# Patient Record
Sex: MALE | Race: ASIAN | Employment: FULL TIME | URBAN - METROPOLITAN AREA
[De-identification: names, ages, dates, MRNs, and addresses within clinical notes are randomized per-mention and may not be internally consistent; named-entity substitution may affect disease eponyms.]

---

## 2018-06-08 ENCOUNTER — HOSPITAL ENCOUNTER (EMERGENCY)
Facility: HOSPITAL | Age: 53
Discharge: HOME | End: 2018-06-08
Attending: EMERGENCY MEDICINE
Payer: COMMERCIAL

## 2018-06-08 ENCOUNTER — APPOINTMENT (OUTPATIENT)
Dept: CT IMAGING | Facility: HOSPITAL | Age: 53
End: 2018-06-08
Attending: EMERGENCY MEDICINE
Payer: COMMERCIAL

## 2018-06-08 VITALS
RESPIRATION RATE: 17 BRPM | HEART RATE: 60 BPM | TEMPERATURE: 98 F | WEIGHT: 212 LBS | SYSTOLIC BLOOD PRESSURE: 118 MMHG | HEIGHT: 66 IN | BODY MASS INDEX: 34.07 KG/M2 | DIASTOLIC BLOOD PRESSURE: 74 MMHG | OXYGEN SATURATION: 97 %

## 2018-06-08 DIAGNOSIS — N20.1 URETERAL STONE: Primary | ICD-10-CM

## 2018-06-08 PROCEDURE — 96361 HYDRATE IV INFUSION ADD-ON: CPT

## 2018-06-08 PROCEDURE — 96374 THER/PROPH/DIAG INJ IV PUSH: CPT

## 2018-06-08 PROCEDURE — 80053 COMPREHEN METABOLIC PANEL: CPT

## 2018-06-08 PROCEDURE — 99284 EMERGENCY DEPT VISIT MOD MDM: CPT

## 2018-06-08 PROCEDURE — 80053 COMPREHEN METABOLIC PANEL: CPT | Performed by: EMERGENCY MEDICINE

## 2018-06-08 PROCEDURE — 85025 COMPLETE CBC W/AUTO DIFF WBC: CPT

## 2018-06-08 PROCEDURE — 81001 URINALYSIS AUTO W/SCOPE: CPT | Performed by: EMERGENCY MEDICINE

## 2018-06-08 PROCEDURE — 74176 CT ABD & PELVIS W/O CONTRAST: CPT | Performed by: EMERGENCY MEDICINE

## 2018-06-08 PROCEDURE — 85025 COMPLETE CBC W/AUTO DIFF WBC: CPT | Performed by: EMERGENCY MEDICINE

## 2018-06-08 PROCEDURE — 96375 TX/PRO/DX INJ NEW DRUG ADDON: CPT

## 2018-06-08 RX ORDER — ONDANSETRON 2 MG/ML
4 INJECTION INTRAMUSCULAR; INTRAVENOUS ONCE
Status: COMPLETED | OUTPATIENT
Start: 2018-06-08 | End: 2018-06-08

## 2018-06-08 RX ORDER — HYDROCODONE BITARTRATE AND ACETAMINOPHEN 5; 325 MG/1; MG/1
1-2 TABLET ORAL EVERY 4 HOURS PRN
Qty: 10 TABLET | Refills: 0 | Status: SHIPPED | OUTPATIENT
Start: 2018-06-08 | End: 2018-06-15

## 2018-06-08 RX ORDER — HYDROMORPHONE HYDROCHLORIDE 1 MG/ML
INJECTION, SOLUTION INTRAMUSCULAR; INTRAVENOUS; SUBCUTANEOUS
Status: DISCONTINUED
Start: 2018-06-08 | End: 2018-06-08

## 2018-06-08 RX ORDER — ONDANSETRON 4 MG/1
4 TABLET, ORALLY DISINTEGRATING ORAL EVERY 4 HOURS PRN
Qty: 10 TABLET | Refills: 0 | Status: SHIPPED | OUTPATIENT
Start: 2018-06-08 | End: 2018-06-15

## 2018-06-08 RX ORDER — KETOROLAC TROMETHAMINE 30 MG/ML
30 INJECTION, SOLUTION INTRAMUSCULAR; INTRAVENOUS ONCE
Status: COMPLETED | OUTPATIENT
Start: 2018-06-08 | End: 2018-06-08

## 2018-06-08 RX ORDER — HYDROMORPHONE HYDROCHLORIDE 1 MG/ML
0.5 INJECTION, SOLUTION INTRAMUSCULAR; INTRAVENOUS; SUBCUTANEOUS ONCE
Status: COMPLETED | OUTPATIENT
Start: 2018-06-08 | End: 2018-06-08

## 2018-06-08 RX ORDER — TAMSULOSIN HYDROCHLORIDE 0.4 MG/1
0.4 CAPSULE ORAL DAILY
Qty: 7 CAPSULE | Refills: 0 | Status: SHIPPED | OUTPATIENT
Start: 2018-06-08 | End: 2018-06-15

## 2018-06-08 RX ORDER — SODIUM CHLORIDE 9 MG/ML
INJECTION, SOLUTION INTRAVENOUS CONTINUOUS
Status: DISCONTINUED | OUTPATIENT
Start: 2018-06-08 | End: 2018-06-08

## 2018-06-08 RX ORDER — ONDANSETRON 2 MG/ML
INJECTION INTRAMUSCULAR; INTRAVENOUS
Status: DISCONTINUED
Start: 2018-06-08 | End: 2018-06-08

## 2018-06-08 NOTE — ED INITIAL ASSESSMENT (HPI)
Patient to ED for RLQ and left sided flank pain upon waking up this morning. Patient with history of renal colic. Denies N/V/D. Denies fevers. Denies urinary symptoms.

## 2018-06-08 NOTE — ED PROVIDER NOTES
Patient Seen in: [facility] Emergency Department    History   Patient presents with:  Abdomen/Flank Pain (GI/)    Stated Complaint: Flank pain     HPI    50-year-old male presents with abdominal and flank pain.   He currently reports 9 out of 10 pa rhythm and no murmur   Abdomen: Mild tenderness on palpation diffusely across the lower abdomen. No CVA tenderness on palpation or percussion bilaterally. Normal bowel sounds throughout. No abdominal masses.   No peritoneal signs   Extremities: no edema, IV,NO ORAL) (CPT=74176)  COMPARISON:  [facility] , CT ABD/PLV(S) Talya Gonzalez W/3D, 7/13/2011, 18:54. INDICATIONS:  Flank pain  TECHNIQUE:  Unenhanced multislice CT scanning from above the kidneys to below the urinary bladder.   2D rendering are generated on th pleural disease. OTHER:  Negative. CONCLUSION:   1. There is a 2 mm mildly obstructing calculus in the proximal left ureter. Multiple additional nonobstructing calculi in the kidneys are noted.   2.  The infrarenal abdominal aorta has mild in had no questions, complaints, or concerns. Patient felt comfortable going home.             Disposition and Plan     Clinical Impression:  Ureteral stone  (primary encounter diagnosis)    Disposition:  Discharge  6/8/2018 12:30 pm    Follow-up:  Lester Robles,

## 2018-06-09 ENCOUNTER — HOSPITAL ENCOUNTER (EMERGENCY)
Age: 53
Discharge: HOME | End: 2018-06-09
Attending: EMERGENCY MEDICINE
Payer: COMMERCIAL

## 2018-06-09 VITALS
DIASTOLIC BLOOD PRESSURE: 63 MMHG | SYSTOLIC BLOOD PRESSURE: 121 MMHG | RESPIRATION RATE: 20 BRPM | BODY MASS INDEX: 34.07 KG/M2 | HEART RATE: 84 BPM | HEIGHT: 66 IN | OXYGEN SATURATION: 96 % | WEIGHT: 212 LBS

## 2018-06-09 DIAGNOSIS — N23 RENAL COLIC: Primary | ICD-10-CM

## 2018-06-09 PROCEDURE — 99284 EMERGENCY DEPT VISIT MOD MDM: CPT

## 2018-06-09 PROCEDURE — 96374 THER/PROPH/DIAG INJ IV PUSH: CPT

## 2018-06-09 PROCEDURE — 96361 HYDRATE IV INFUSION ADD-ON: CPT

## 2018-06-09 PROCEDURE — 96375 TX/PRO/DX INJ NEW DRUG ADDON: CPT

## 2018-06-09 RX ORDER — KETOROLAC TROMETHAMINE 10 MG/1
10 TABLET, FILM COATED ORAL EVERY 6 HOURS PRN
Qty: 30 TABLET | Refills: 0 | Status: SHIPPED | OUTPATIENT
Start: 2018-06-09 | End: 2018-06-16

## 2018-06-09 RX ORDER — KETOROLAC TROMETHAMINE 30 MG/ML
30 INJECTION, SOLUTION INTRAMUSCULAR; INTRAVENOUS ONCE
Status: COMPLETED | OUTPATIENT
Start: 2018-06-09 | End: 2018-06-09

## 2018-06-09 RX ORDER — ONDANSETRON 2 MG/ML
4 INJECTION INTRAMUSCULAR; INTRAVENOUS ONCE
Status: COMPLETED | OUTPATIENT
Start: 2018-06-09 | End: 2018-06-09

## 2018-06-09 RX ORDER — ONDANSETRON 4 MG/1
4 TABLET, FILM COATED ORAL ONCE
Status: DISCONTINUED | OUTPATIENT
Start: 2018-06-09 | End: 2018-06-09

## 2018-06-09 RX ORDER — HYDROMORPHONE HYDROCHLORIDE 1 MG/ML
0.5 INJECTION, SOLUTION INTRAMUSCULAR; INTRAVENOUS; SUBCUTANEOUS EVERY 30 MIN PRN
Status: DISCONTINUED | OUTPATIENT
Start: 2018-06-09 | End: 2018-06-09

## 2018-06-09 NOTE — ED PROVIDER NOTES
Patient Seen in: [facility] In [place]    History   Patient presents with:  Abdomen/Flank Pain (GI/)    Stated Complaint: left sided kideny stone, dx today sent home with meds , [pain worse    HPI    This is a 81-year-old male with Lungs: Clear to auscultation bilaterally with no rales, no retractions, and no wheezing. HEART:  Regular rate and rhythm. S1 and S2. No murmurs, no rubs or gallops. ABDOMEN: Soft, nontender and nondistended. Normoactive bowel sounds. No rebound.  No gu

## 2018-07-10 PROBLEM — N28.1 RENAL CYST: Status: ACTIVE | Noted: 2018-07-10

## 2018-07-10 PROBLEM — N13.2 URETERAL STONE WITH HYDRONEPHROSIS: Status: ACTIVE | Noted: 2018-07-10

## 2022-07-09 ENCOUNTER — HOSPITAL ENCOUNTER (EMERGENCY)
Age: 57
Discharge: HOME | End: 2022-07-09
Attending: EMERGENCY MEDICINE
Payer: COMMERCIAL

## 2022-07-09 VITALS
HEART RATE: 92 BPM | WEIGHT: 200 LBS | HEIGHT: 66 IN | RESPIRATION RATE: 20 BRPM | OXYGEN SATURATION: 95 % | TEMPERATURE: 99 F | DIASTOLIC BLOOD PRESSURE: 81 MMHG | SYSTOLIC BLOOD PRESSURE: 127 MMHG | BODY MASS INDEX: 32.14 KG/M2

## 2022-07-09 DIAGNOSIS — R73.03 PREDIABETES: ICD-10-CM

## 2022-07-09 DIAGNOSIS — U07.1 COVID-19 VIRUS INFECTION: Primary | ICD-10-CM

## 2022-07-09 LAB
ALBUMIN SERPL-MCNC: 3.8 G/DL (ref 3.4–5)
ALBUMIN/GLOB SERPL: 1 {RATIO} (ref 1–2)
ALP LIVER SERPL-CCNC: 67 U/L
ALT SERPL-CCNC: 49 U/L
ANION GAP SERPL CALC-SCNC: 7 MMOL/L (ref 0–18)
AST SERPL-CCNC: 29 U/L (ref 15–37)
ATRIAL RATE: 93 BPM
BASOPHILS # BLD AUTO: 0.05 X10(3) UL (ref 0–0.2)
BASOPHILS NFR BLD AUTO: 0.5 %
BILIRUB SERPL-MCNC: 0.6 MG/DL (ref 0.1–2)
BUN BLD-MCNC: 16 MG/DL (ref 7–18)
CALCIUM BLD-MCNC: 9.1 MG/DL (ref 8.5–10.1)
CHLORIDE SERPL-SCNC: 103 MMOL/L (ref 98–112)
CO2 SERPL-SCNC: 25 MMOL/L (ref 21–32)
CREAT BLD-MCNC: 1.1 MG/DL
EOSINOPHIL # BLD AUTO: 0.25 X10(3) UL (ref 0–0.7)
EOSINOPHIL NFR BLD AUTO: 2.5 %
ERYTHROCYTE [DISTWIDTH] IN BLOOD BY AUTOMATED COUNT: 18.4 %
GLOBULIN PLAS-MCNC: 3.7 G/DL (ref 2.8–4.4)
GLUCOSE BLD-MCNC: 174 MG/DL (ref 70–99)
HCT VFR BLD AUTO: 43 %
HGB BLD-MCNC: 12.9 G/DL
IMM GRANULOCYTES # BLD AUTO: 0.04 X10(3) UL (ref 0–1)
IMM GRANULOCYTES NFR BLD: 0.4 %
LYMPHOCYTES # BLD AUTO: 0.75 X10(3) UL (ref 1–4)
LYMPHOCYTES NFR BLD AUTO: 7.6 %
MCH RBC QN AUTO: 18.8 PG (ref 26–34)
MCHC RBC AUTO-ENTMCNC: 30 G/DL (ref 31–37)
MCV RBC AUTO: 62.8 FL
MONOCYTES # BLD AUTO: 0.79 X10(3) UL (ref 0.1–1)
MONOCYTES NFR BLD AUTO: 8 %
NEUTROPHILS # BLD AUTO: 8.01 X10 (3) UL (ref 1.5–7.7)
NEUTROPHILS # BLD AUTO: 8.01 X10(3) UL (ref 1.5–7.7)
NEUTROPHILS NFR BLD AUTO: 81 %
OSMOLALITY SERPL CALC.SUM OF ELEC: 285 MOSM/KG (ref 275–295)
P AXIS: 62 DEGREES
P-R INTERVAL: 172 MS
PLATELET # BLD AUTO: 301 10(3)UL (ref 150–450)
PLATELET MORPHOLOGY: NORMAL
POTASSIUM SERPL-SCNC: 4.3 MMOL/L (ref 3.5–5.1)
PROT SERPL-MCNC: 7.5 G/DL (ref 6.4–8.2)
Q-T INTERVAL: 352 MS
QRS DURATION: 86 MS
QTC CALCULATION (BEZET): 437 MS
R AXIS: 65 DEGREES
RBC # BLD AUTO: 6.85 X10(6)UL
SARS-COV-2 RNA RESP QL NAA+PROBE: DETECTED
SODIUM SERPL-SCNC: 135 MMOL/L (ref 136–145)
T AXIS: 62 DEGREES
TROPONIN I HIGH SENSITIVITY: 5 NG/L
VENTRICULAR RATE: 93 BPM
WBC # BLD AUTO: 9.9 X10(3) UL (ref 4–11)

## 2022-07-09 PROCEDURE — 93010 ELECTROCARDIOGRAM REPORT: CPT

## 2022-07-09 PROCEDURE — 99284 EMERGENCY DEPT VISIT MOD MDM: CPT

## 2022-07-09 PROCEDURE — 85025 COMPLETE CBC W/AUTO DIFF WBC: CPT | Performed by: EMERGENCY MEDICINE

## 2022-07-09 PROCEDURE — 93005 ELECTROCARDIOGRAM TRACING: CPT

## 2022-07-09 PROCEDURE — 96375 TX/PRO/DX INJ NEW DRUG ADDON: CPT

## 2022-07-09 PROCEDURE — 85025 COMPLETE CBC W/AUTO DIFF WBC: CPT

## 2022-07-09 PROCEDURE — 80053 COMPREHEN METABOLIC PANEL: CPT | Performed by: EMERGENCY MEDICINE

## 2022-07-09 PROCEDURE — 84484 ASSAY OF TROPONIN QUANT: CPT | Performed by: EMERGENCY MEDICINE

## 2022-07-09 PROCEDURE — 96374 THER/PROPH/DIAG INJ IV PUSH: CPT

## 2022-07-09 PROCEDURE — 80053 COMPREHEN METABOLIC PANEL: CPT

## 2022-07-09 PROCEDURE — 84484 ASSAY OF TROPONIN QUANT: CPT

## 2022-07-09 RX ORDER — BEBTELOVIMAB 87.5 MG/ML
175 INJECTION, SOLUTION INTRAVENOUS ONCE
Status: COMPLETED | OUTPATIENT
Start: 2022-07-09 | End: 2022-07-09

## 2022-07-09 RX ORDER — BENZONATATE 100 MG/1
100 CAPSULE ORAL 3 TIMES DAILY PRN
Qty: 30 CAPSULE | Refills: 0 | Status: SHIPPED | OUTPATIENT
Start: 2022-07-09 | End: 2022-08-08

## 2022-07-09 RX ORDER — KETOROLAC TROMETHAMINE 15 MG/ML
15 INJECTION, SOLUTION INTRAMUSCULAR; INTRAVENOUS ONCE
Status: COMPLETED | OUTPATIENT
Start: 2022-07-09 | End: 2022-07-09

## 2022-07-09 RX ORDER — DIPHENHYDRAMINE HYDROCHLORIDE 50 MG/ML
25 INJECTION INTRAMUSCULAR; INTRAVENOUS ONCE
Status: COMPLETED | OUTPATIENT
Start: 2022-07-09 | End: 2022-07-09

## 2022-07-09 RX ORDER — ESCITALOPRAM OXALATE 10 MG/1
10 TABLET ORAL DAILY
COMMUNITY

## 2022-07-09 NOTE — ED INITIAL ASSESSMENT (HPI)
PT to the ED for evaluation of syncopal episode at home at 0400.  PT reports he has been coughing all night and has family members in the household who are currently COVID +

## 2023-09-06 ENCOUNTER — APPOINTMENT (OUTPATIENT)
Dept: CT IMAGING | Facility: HOSPITAL | Age: 58
End: 2023-09-06
Attending: EMERGENCY MEDICINE
Payer: COMMERCIAL

## 2023-09-06 ENCOUNTER — HOSPITAL ENCOUNTER (EMERGENCY)
Facility: HOSPITAL | Age: 58
Discharge: HOME | End: 2023-09-06
Attending: EMERGENCY MEDICINE
Payer: COMMERCIAL

## 2023-09-06 VITALS
HEART RATE: 69 BPM | SYSTOLIC BLOOD PRESSURE: 145 MMHG | OXYGEN SATURATION: 98 % | RESPIRATION RATE: 18 BRPM | HEIGHT: 67 IN | DIASTOLIC BLOOD PRESSURE: 89 MMHG | WEIGHT: 220 LBS | BODY MASS INDEX: 34.53 KG/M2

## 2023-09-06 DIAGNOSIS — R07.89 CHEST PAIN, MUSCULOSKELETAL: Primary | ICD-10-CM

## 2023-09-06 LAB
ALBUMIN SERPL-MCNC: 3.5 G/DL (ref 3.4–5)
ALBUMIN/GLOB SERPL: 0.9 {RATIO} (ref 1–2)
ALP LIVER SERPL-CCNC: 73 U/L
ALT SERPL-CCNC: 79 U/L
ANION GAP SERPL CALC-SCNC: 8 MMOL/L (ref 0–18)
APTT PPP: 40.7 SECONDS (ref 23.3–35.6)
AST SERPL-CCNC: 39 U/L (ref 15–37)
BASOPHILS # BLD AUTO: 0.06 X10(3) UL (ref 0–0.2)
BASOPHILS NFR BLD AUTO: 1 %
BILIRUB SERPL-MCNC: 0.4 MG/DL (ref 0.1–2)
BUN BLD-MCNC: 16 MG/DL (ref 7–18)
CALCIUM BLD-MCNC: 9 MG/DL (ref 8.5–10.1)
CHLORIDE SERPL-SCNC: 109 MMOL/L (ref 98–112)
CO2 SERPL-SCNC: 21 MMOL/L (ref 21–32)
CREAT BLD-MCNC: 0.88 MG/DL
D DIMER PPP FEU-MCNC: 0.5 UG/ML FEU (ref ?–0.57)
EGFRCR SERPLBLD CKD-EPI 2021: 100 ML/MIN/1.73M2 (ref 60–?)
EOSINOPHIL # BLD AUTO: 0.38 X10(3) UL (ref 0–0.7)
EOSINOPHIL NFR BLD AUTO: 6.2 %
ERYTHROCYTE [DISTWIDTH] IN BLOOD BY AUTOMATED COUNT: 18.7 %
FLUAV + FLUBV RNA SPEC NAA+PROBE: NEGATIVE
FLUAV + FLUBV RNA SPEC NAA+PROBE: NEGATIVE
GLOBULIN PLAS-MCNC: 3.8 G/DL (ref 2.8–4.4)
GLUCOSE BLD-MCNC: 132 MG/DL (ref 70–99)
HCT VFR BLD AUTO: 43.6 %
HGB BLD-MCNC: 13.3 G/DL
IMM GRANULOCYTES # BLD AUTO: 0.02 X10(3) UL (ref 0–1)
IMM GRANULOCYTES NFR BLD: 0.3 %
INR BLD: 0.95 (ref 0.85–1.16)
LYMPHOCYTES # BLD AUTO: 2.18 X10(3) UL (ref 1–4)
LYMPHOCYTES NFR BLD AUTO: 35.3 %
MCH RBC QN AUTO: 19.3 PG (ref 26–34)
MCHC RBC AUTO-ENTMCNC: 30.5 G/DL (ref 31–37)
MCV RBC AUTO: 63.2 FL
MONOCYTES # BLD AUTO: 0.46 X10(3) UL (ref 0.1–1)
MONOCYTES NFR BLD AUTO: 7.5 %
NEUTROPHILS # BLD AUTO: 3.07 X10 (3) UL (ref 1.5–7.7)
NEUTROPHILS # BLD AUTO: 3.07 X10(3) UL (ref 1.5–7.7)
NEUTROPHILS NFR BLD AUTO: 49.7 %
NT-PROBNP SERPL-MCNC: 16 PG/ML (ref ?–125)
OSMOLALITY SERPL CALC.SUM OF ELEC: 289 MOSM/KG (ref 275–295)
PLATELET # BLD AUTO: 284 10(3)UL (ref 150–450)
POTASSIUM SERPL-SCNC: 4 MMOL/L (ref 3.5–5.1)
PROT SERPL-MCNC: 7.3 G/DL (ref 6.4–8.2)
PROTHROMBIN TIME: 12.7 SECONDS (ref 11.6–14.8)
RBC # BLD AUTO: 6.9 X10(6)UL
RSV RNA SPEC NAA+PROBE: NEGATIVE
SARS-COV-2 RNA RESP QL NAA+PROBE: NOT DETECTED
SODIUM SERPL-SCNC: 138 MMOL/L (ref 136–145)
TROPONIN I HIGH SENSITIVITY: 6 NG/L
WBC # BLD AUTO: 6.2 X10(3) UL (ref 4–11)

## 2023-09-06 PROCEDURE — 93005 ELECTROCARDIOGRAM TRACING: CPT

## 2023-09-06 PROCEDURE — 85610 PROTHROMBIN TIME: CPT | Performed by: EMERGENCY MEDICINE

## 2023-09-06 PROCEDURE — 85379 FIBRIN DEGRADATION QUANT: CPT | Performed by: EMERGENCY MEDICINE

## 2023-09-06 PROCEDURE — 71275 CT ANGIOGRAPHY CHEST: CPT | Performed by: EMERGENCY MEDICINE

## 2023-09-06 PROCEDURE — 85730 THROMBOPLASTIN TIME PARTIAL: CPT | Performed by: EMERGENCY MEDICINE

## 2023-09-06 PROCEDURE — 96374 THER/PROPH/DIAG INJ IV PUSH: CPT

## 2023-09-06 PROCEDURE — 84484 ASSAY OF TROPONIN QUANT: CPT | Performed by: EMERGENCY MEDICINE

## 2023-09-06 PROCEDURE — 99285 EMERGENCY DEPT VISIT HI MDM: CPT

## 2023-09-06 PROCEDURE — 83880 ASSAY OF NATRIURETIC PEPTIDE: CPT | Performed by: EMERGENCY MEDICINE

## 2023-09-06 PROCEDURE — 85025 COMPLETE CBC W/AUTO DIFF WBC: CPT | Performed by: EMERGENCY MEDICINE

## 2023-09-06 PROCEDURE — 93010 ELECTROCARDIOGRAM REPORT: CPT

## 2023-09-06 PROCEDURE — 80053 COMPREHEN METABOLIC PANEL: CPT | Performed by: EMERGENCY MEDICINE

## 2023-09-06 PROCEDURE — 0241U SARS-COV-2/FLU A AND B/RSV BY PCR (GENEXPERT): CPT | Performed by: EMERGENCY MEDICINE

## 2023-09-06 RX ORDER — KETOROLAC TROMETHAMINE 15 MG/ML
INJECTION, SOLUTION INTRAMUSCULAR; INTRAVENOUS
Status: COMPLETED
Start: 2023-09-06 | End: 2023-09-06

## 2023-09-06 RX ORDER — KETOROLAC TROMETHAMINE 15 MG/ML
15 INJECTION, SOLUTION INTRAMUSCULAR; INTRAVENOUS ONCE
Status: COMPLETED | OUTPATIENT
Start: 2023-09-06 | End: 2023-09-06

## 2023-09-06 RX ORDER — NAPROXEN 500 MG/1
500 TABLET ORAL 2 TIMES DAILY PRN
Qty: 20 TABLET | Refills: 0 | Status: SHIPPED | OUTPATIENT
Start: 2023-09-06 | End: 2023-09-16

## 2023-09-06 NOTE — ED INITIAL ASSESSMENT (HPI)
Pt states left sided chest pain at 0430 today. Pt states pain is intermittent. Pt states pain with movement.

## 2023-09-07 LAB
ATRIAL RATE: 72 BPM
P AXIS: 2 DEGREES
P-R INTERVAL: 168 MS
Q-T INTERVAL: 362 MS
QRS DURATION: 78 MS
QTC CALCULATION (BEZET): 396 MS
R AXIS: 46 DEGREES
T AXIS: 44 DEGREES
VENTRICULAR RATE: 72 BPM

## 2023-10-17 ENCOUNTER — OFFICE VISIT (OUTPATIENT)
Facility: CLINIC | Age: 58
End: 2023-10-17

## 2023-10-17 ENCOUNTER — TELEPHONE (OUTPATIENT)
Facility: CLINIC | Age: 58
End: 2023-10-17

## 2023-10-17 VITALS
HEART RATE: 73 BPM | BODY MASS INDEX: 34.53 KG/M2 | DIASTOLIC BLOOD PRESSURE: 83 MMHG | SYSTOLIC BLOOD PRESSURE: 127 MMHG | WEIGHT: 220 LBS | HEIGHT: 67 IN

## 2023-10-17 DIAGNOSIS — R74.8 ELEVATED LIVER ENZYMES: Primary | ICD-10-CM

## 2023-10-17 DIAGNOSIS — Z12.11 COLON CANCER SCREENING: ICD-10-CM

## 2023-10-17 DIAGNOSIS — Z12.11 SCREEN FOR COLON CANCER: Primary | ICD-10-CM

## 2023-10-17 PROCEDURE — S0285 CNSLT BEFORE SCREEN COLONOSC: HCPCS | Performed by: STUDENT IN AN ORGANIZED HEALTH CARE EDUCATION/TRAINING PROGRAM

## 2023-10-17 PROCEDURE — 3008F BODY MASS INDEX DOCD: CPT | Performed by: STUDENT IN AN ORGANIZED HEALTH CARE EDUCATION/TRAINING PROGRAM

## 2023-10-17 PROCEDURE — 3074F SYST BP LT 130 MM HG: CPT | Performed by: STUDENT IN AN ORGANIZED HEALTH CARE EDUCATION/TRAINING PROGRAM

## 2023-10-17 PROCEDURE — 3079F DIAST BP 80-89 MM HG: CPT | Performed by: STUDENT IN AN ORGANIZED HEALTH CARE EDUCATION/TRAINING PROGRAM

## 2023-10-17 RX ORDER — SODIUM, POTASSIUM,MAG SULFATES 17.5-3.13G
SOLUTION, RECONSTITUTED, ORAL ORAL
Qty: 1 EACH | Refills: 0 | Status: SHIPPED | OUTPATIENT
Start: 2023-10-17

## 2023-10-17 NOTE — PATIENT INSTRUCTIONS
*Schedule ultrasound of your liver  *Get labs done when able to further evaluate your elevated liver numbers. _______________________________________________    1. Schedule colonoscopy with [facility] [Diagnosis: colon cancer screening]    2.  bowel prep from pharmacy (split dose suprep)    3. Continue all medications for procedure    4. Read all bowel prep instructions carefully    5. AVOID seeds, nuts, popcorn, raw fruits and vegetables (cooked is okay) for 2-3 days before procedure    6. If you start any NEW medication after your visit today, please notify us. Certain medications will need to be held before the procedure, or the procedure cannot be performed.

## 2023-10-17 NOTE — H&P
[facility], [facility] - Gastroenterology                                                                                                               Reason for consult: ***    Requesting physician or provider: Doris Mayers MD    No chief complaint on file. HPI:   Katherine Mendoza is a 63year old year-old man***woman with history of ***          Prior endoscopies:  ***    Soc:  -*** smoking  -*** Etoh    Wt Readings from Last 6 Encounters:  09/06/23 : 220 lb (99.8 kg)  07/09/22 : 200 lb (90.7 kg)  07/10/18 : 212 lb (96.2 kg)  06/09/18 : 212 lb (96.2 kg)  06/08/18 : 212 lb (96.2 kg)  12/26/14 : 200 lb (90.7 kg)       History, Medications, Allergies, ROS:      Past Medical History:   Diagnosis Date    Chest pain, unspecified 10/7/10    Essential hypertension     Hyperlipidemia     Pain in limb 10/7/10    Personal history of urinary calculi       No past surgical history on file. Family Hx:   Family History   Problem Relation Age of Onset    Diabetes Mother     Hypertension Mother     Hypertension Father       Social History:   Social History     Socioeconomic History    Marital status: Single   Tobacco Use    Smoking status: Every Day     Packs/day: .5     Types: Cigarettes    Smokeless tobacco: Never    Tobacco comments:     quit two years ago   Vaping Use    Vaping Use: Never used   Substance and Sexual Activity    Alcohol use: Yes     Comment: socially    Drug use: No   Other Topics Concern    Caffeine Concern Yes    Exercise No        Medications (Active prior to today's visit):  Current Outpatient Medications   Medication Sig Dispense Refill    escitalopram 10 MG Oral Tab Take 0.5 tablets (5 mg total) by mouth daily. CANDESARTAN CILEXETIL OR Take 8 mg by mouth daily. ATORVASTATIN CALCIUM OR Take by mouth.          Allergies:  No Known Allergies    ROS:   CONSTITUTIONAL:  negative for fevers, rigors  EYES: negative for diplopia   RESPIRATORY:  negative for severe shortness of breath  CARDIOVASCULAR:  negative for crushing sub-sternal chest pain  GASTROINTESTINAL:  see HPI  GENITOURINARY:  negative for dysuria or gross hematuria  INTEGUMENT/BREAST:  SKIN:  negative for jaundice   ALLERGIC/IMMUNOLOGIC:  negative for hay fever  ENDOCRINE:  negative for cold intolerance and heat intolerance  MUSCULOSKELETAL:  negative for joint effusion/severe erythema  BEHAVIOR/PSYCH:  negative for psychotic behavior      PHYSICAL EXAM:   There were no vitals taken for this visit. Gen- Patient appears comfortable and in no acute discomfort  HEENT: the sclera appears anicteric, oropharynx clear, mucus membranes appear moist  CV- regular rate and rhythm, the extremities are warm and well perfused   Lung- Moves air well; No labored breathing  Abdomen- soft, non-tender exam in all quadrants without rigidity or guarding, non-distended  Skin- No jaundice  Ext: no edema is evident. Neuro- Alert and interactive, and gross movements of extremities normal  Psych - appropriate, non-agitated    Labs/Imaging:     Patient's pertinent labs and imaging were reviewed and discussed with patient today. .  ASSESSMENT/PLAN:   ***    #***    Recommendations:  -***      Copy of this note sent to Dr. Jean Castro Orders This Visit:  No orders of the defined types were placed in this encounter. Meds This Visit:  Requested Prescriptions      No prescriptions requested or ordered in this encounter       Imaging & Referrals:  None         Rito Jones MD  [facility], [facility] Gastroenterology  10/17/2023      This note was partially prepared using [employer] voice recognition dictation software. As a result, errors may occur. When identified, these errors have been corrected.  While every attempt is made to correct errors during dictation, discrepancies may still exist.

## 2023-10-21 ENCOUNTER — LAB ENCOUNTER (OUTPATIENT)
Dept: LAB | Age: 58
End: 2023-10-21
Attending: STUDENT IN AN ORGANIZED HEALTH CARE EDUCATION/TRAINING PROGRAM

## 2023-10-21 DIAGNOSIS — R74.8 ELEVATED LIVER ENZYMES: ICD-10-CM

## 2023-10-21 LAB
DEPRECATED HBV CORE AB SER IA-ACNC: 696.8 NG/ML
HAV AB SER QL IA: REACTIVE
HBV CORE AB SERPL QL IA: NONREACTIVE
HBV SURFACE AB SER QL: NONREACTIVE
HBV SURFACE AB SERPL IA-ACNC: <3.1 MIU/ML
HBV SURFACE AG SERPL QL IA: NONREACTIVE
HCV AB SERPL QL IA: NONREACTIVE
IGA SERPL-MCNC: 395 MG/DL (ref 70–312)
IGM SERPL-MCNC: 85.8 MG/DL (ref 43–279)
IMMUNOGLOBULIN PNL SER-MCNC: 1250 MG/DL (ref 791–1643)
IRON SATN MFR SERPL: 30 %
IRON SERPL-MCNC: 108 UG/DL
TIBC SERPL-MCNC: 356 UG/DL (ref 240–450)
TRANSFERRIN SERPL-MCNC: 239 MG/DL (ref 200–360)

## 2023-10-21 PROCEDURE — 86803 HEPATITIS C AB TEST: CPT

## 2023-10-21 PROCEDURE — 86704 HEP B CORE ANTIBODY TOTAL: CPT

## 2023-10-21 PROCEDURE — 82784 ASSAY IGA/IGD/IGG/IGM EACH: CPT

## 2023-10-21 PROCEDURE — 80503 PATH CLIN CONSLTJ SF 5-20: CPT

## 2023-10-21 PROCEDURE — 86708 HEPATITIS A ANTIBODY: CPT

## 2023-10-21 PROCEDURE — 87340 HEPATITIS B SURFACE AG IA: CPT

## 2023-10-21 PROCEDURE — 36415 COLL VENOUS BLD VENIPUNCTURE: CPT

## 2023-10-21 PROCEDURE — 86709 HEPATITIS A IGM ANTIBODY: CPT

## 2023-10-21 PROCEDURE — 83540 ASSAY OF IRON: CPT

## 2023-10-21 PROCEDURE — 82728 ASSAY OF FERRITIN: CPT

## 2023-10-21 PROCEDURE — 86706 HEP B SURFACE ANTIBODY: CPT

## 2023-10-21 PROCEDURE — 83516 IMMUNOASSAY NONANTIBODY: CPT

## 2023-10-21 PROCEDURE — 83550 IRON BINDING TEST: CPT

## 2023-10-22 LAB — HAV IGM SER QL: NONREACTIVE

## 2023-10-23 NOTE — PROGRESS NOTES
I spoke to the patient/wife regarding results. Immune to hepatitis a. Awaiting smooth muscle antibody. He likely has HERZOG. Will follow up US liver (already scheduled). GI Staff:    Please place recall for CMP in 6 months.

## 2023-10-24 LAB — ACTIN SMOOTH MUSCLE AB: 15 UNITS

## 2023-10-29 ENCOUNTER — TELEPHONE (OUTPATIENT)
Facility: CLINIC | Age: 58
End: 2023-10-29

## 2023-10-29 NOTE — TELEPHONE ENCOUNTER
----- Message from Liz Hollis MD sent at 10/23/2023  2:34 PM CDT -----  I spoke to the patient/wife regarding results. Immune to hepatitis a. Awaiting smooth muscle antibody. He likely has HERZOG. Will follow up US liver (already scheduled). GI Staff:    Please place recall for CMP in 6 months.

## 2023-11-17 ENCOUNTER — HOSPITAL ENCOUNTER (OUTPATIENT)
Dept: CV DIAGNOSTICS | Age: 58
Discharge: HOME | End: 2023-11-17
Attending: INTERNAL MEDICINE
Payer: COMMERCIAL

## 2023-11-17 DIAGNOSIS — R06.09 DYSPNEA ON EXERTION: ICD-10-CM

## 2023-11-17 PROCEDURE — 93350 STRESS TTE ONLY: CPT | Performed by: INTERNAL MEDICINE

## 2023-11-17 PROCEDURE — 93018 CV STRESS TEST I&R ONLY: CPT | Performed by: INTERNAL MEDICINE

## 2023-11-17 PROCEDURE — 93017 CV STRESS TEST TRACING ONLY: CPT | Performed by: INTERNAL MEDICINE

## 2023-11-24 ENCOUNTER — HOSPITAL ENCOUNTER (OUTPATIENT)
Dept: ULTRASOUND IMAGING | Age: 58
Discharge: HOME | End: 2023-11-24
Attending: STUDENT IN AN ORGANIZED HEALTH CARE EDUCATION/TRAINING PROGRAM
Payer: COMMERCIAL

## 2023-11-24 DIAGNOSIS — R74.8 ELEVATED LIVER ENZYMES: ICD-10-CM

## 2023-11-24 PROCEDURE — 76705 ECHO EXAM OF ABDOMEN: CPT | Performed by: STUDENT IN AN ORGANIZED HEALTH CARE EDUCATION/TRAINING PROGRAM

## 2023-12-28 PROCEDURE — 88305 TISSUE EXAM BY PATHOLOGIST: CPT | Performed by: STUDENT IN AN ORGANIZED HEALTH CARE EDUCATION/TRAINING PROGRAM

## 2024-01-04 ENCOUNTER — TELEPHONE (OUTPATIENT)
Facility: CLINIC | Age: 59
End: 2024-01-04

## 2024-01-04 NOTE — TELEPHONE ENCOUNTER
Health Maintenance Updated.    3 year colonoscopy recall entered into patient outreach in [facility].  Next colonoscopy will be due 12/28/2026.    Patient viewed Dr. Siegel's result message in [employer]:  Seen by patient Ricardo Flores on 1/3/2024  6:21 PM

## 2024-01-04 NOTE — TELEPHONE ENCOUNTER
----- Message from Ken Siegel MD sent at 1/3/2024  2:51 PM CST -----  7 adenomas removed, should have repeat colonoscopy in 3 years.    I sent mychart to the patient.    GI Staff:    Can you please place recall for this patient to have a colonoscopy in 3 years.    Thank you.    Ken Siegel MD

## 2024-01-29 ENCOUNTER — MED REC SCAN ONLY (OUTPATIENT)
Facility: CLINIC | Age: 59
End: 2024-01-29

## 2024-03-19 ENCOUNTER — ORDER TRANSCRIPTION (OUTPATIENT)
Dept: ADMINISTRATIVE | Facility: HOSPITAL | Age: 59
End: 2024-03-19

## 2024-03-19 DIAGNOSIS — R06.09 DOE (DYSPNEA ON EXERTION): ICD-10-CM

## 2024-03-19 DIAGNOSIS — E78.2 MIXED HYPERLIPIDEMIA: ICD-10-CM

## 2024-03-19 DIAGNOSIS — E11.69 DIABETES MELLITUS ASSOCIATED WITH HORMONAL ETIOLOGY (HCC): Primary | ICD-10-CM

## 2024-03-19 DIAGNOSIS — E66.9 OBESITY, UNSPECIFIED: ICD-10-CM

## 2024-03-19 DIAGNOSIS — Z82.49 FAMILY HISTORY OF EARLY CAD: ICD-10-CM

## 2024-04-30 ENCOUNTER — HOSPITAL ENCOUNTER (OUTPATIENT)
Dept: CV DIAGNOSTICS | Facility: HOSPITAL | Age: 59
Discharge: HOME | End: 2024-04-30
Attending: NURSE PRACTITIONER
Payer: COMMERCIAL

## 2024-04-30 DIAGNOSIS — Z98.61 S/P PTCA (PERCUTANEOUS TRANSLUMINAL CORONARY ANGIOPLASTY): ICD-10-CM

## 2024-04-30 PROCEDURE — 93018 CV STRESS TEST I&R ONLY: CPT | Performed by: NURSE PRACTITIONER

## 2024-04-30 PROCEDURE — 93306 TTE W/DOPPLER COMPLETE: CPT | Performed by: NURSE PRACTITIONER

## 2024-04-30 PROCEDURE — 93017 CV STRESS TEST TRACING ONLY: CPT | Performed by: NURSE PRACTITIONER

## 2024-05-23 ENCOUNTER — APPOINTMENT (OUTPATIENT)
Dept: CARDIAC REHAB | Facility: HOSPITAL | Age: 59
End: 2024-05-23
Payer: COMMERCIAL

## 2024-06-04 ENCOUNTER — CARDPULM VISIT (OUTPATIENT)
Dept: CARDIAC REHAB | Facility: HOSPITAL | Age: 59
End: 2024-06-04
Payer: COMMERCIAL

## 2024-06-04 ENCOUNTER — ORDER TRANSCRIPTION (OUTPATIENT)
Dept: CARDIAC REHAB | Facility: HOSPITAL | Age: 59
End: 2024-06-04

## 2024-06-04 DIAGNOSIS — Z98.61 S/P PTCA (PERCUTANEOUS TRANSLUMINAL CORONARY ANGIOPLASTY): Primary | ICD-10-CM

## 2024-06-04 RX ORDER — ASPIRIN 81 MG/1
81 TABLET ORAL DAILY
COMMUNITY

## 2024-06-04 RX ORDER — CLOPIDOGREL BISULFATE 75 MG/1
75 TABLET ORAL DAILY
COMMUNITY

## 2024-06-04 RX ORDER — ROSUVASTATIN CALCIUM 40 MG/1
40 TABLET, COATED ORAL NIGHTLY
COMMUNITY

## 2024-06-10 ENCOUNTER — CARDPULM VISIT (OUTPATIENT)
Dept: CARDIAC REHAB | Facility: HOSPITAL | Age: 59
End: 2024-06-10
Payer: COMMERCIAL

## 2024-06-10 PROCEDURE — 93798 PHYS/QHP OP CAR RHAB W/ECG: CPT

## 2024-06-12 ENCOUNTER — CARDPULM VISIT (OUTPATIENT)
Dept: CARDIAC REHAB | Facility: HOSPITAL | Age: 59
End: 2024-06-12
Payer: COMMERCIAL

## 2024-06-12 PROCEDURE — 93798 PHYS/QHP OP CAR RHAB W/ECG: CPT

## 2024-06-13 ENCOUNTER — CARDPULM VISIT (OUTPATIENT)
Dept: CARDIAC REHAB | Facility: HOSPITAL | Age: 59
End: 2024-06-13
Payer: COMMERCIAL

## 2024-06-13 PROCEDURE — 93798 PHYS/QHP OP CAR RHAB W/ECG: CPT

## 2024-06-17 ENCOUNTER — CARDPULM VISIT (OUTPATIENT)
Dept: CARDIAC REHAB | Facility: HOSPITAL | Age: 59
End: 2024-06-17
Payer: COMMERCIAL

## 2024-06-17 PROCEDURE — 93798 PHYS/QHP OP CAR RHAB W/ECG: CPT

## 2024-06-19 ENCOUNTER — APPOINTMENT (OUTPATIENT)
Dept: CARDIAC REHAB | Facility: HOSPITAL | Age: 59
End: 2024-06-19

## 2024-06-19 PROCEDURE — 93798 PHYS/QHP OP CAR RHAB W/ECG: CPT

## 2024-06-20 ENCOUNTER — CARDPULM VISIT (OUTPATIENT)
Dept: CARDIAC REHAB | Facility: HOSPITAL | Age: 59
End: 2024-06-20

## 2024-06-20 PROCEDURE — 93798 PHYS/QHP OP CAR RHAB W/ECG: CPT

## 2024-06-24 ENCOUNTER — CARDPULM VISIT (OUTPATIENT)
Dept: CARDIAC REHAB | Facility: HOSPITAL | Age: 59
End: 2024-06-24

## 2024-06-24 PROCEDURE — 93798 PHYS/QHP OP CAR RHAB W/ECG: CPT

## 2024-06-26 ENCOUNTER — CARDPULM VISIT (OUTPATIENT)
Dept: CARDIAC REHAB | Facility: HOSPITAL | Age: 59
End: 2024-06-26

## 2024-06-26 PROCEDURE — 93798 PHYS/QHP OP CAR RHAB W/ECG: CPT

## 2024-06-27 ENCOUNTER — CARDPULM VISIT (OUTPATIENT)
Dept: CARDIAC REHAB | Facility: HOSPITAL | Age: 59
End: 2024-06-27

## 2024-06-27 PROCEDURE — 93798 PHYS/QHP OP CAR RHAB W/ECG: CPT

## 2024-07-01 ENCOUNTER — CARDPULM VISIT (OUTPATIENT)
Dept: CARDIAC REHAB | Facility: HOSPITAL | Age: 59
End: 2024-07-01
Payer: COMMERCIAL

## 2024-07-01 PROCEDURE — 93798 PHYS/QHP OP CAR RHAB W/ECG: CPT

## 2024-07-08 ENCOUNTER — CARDPULM VISIT (OUTPATIENT)
Dept: CARDIAC REHAB | Facility: HOSPITAL | Age: 59
End: 2024-07-08
Payer: COMMERCIAL

## 2024-07-08 PROCEDURE — 93798 PHYS/QHP OP CAR RHAB W/ECG: CPT

## 2024-07-10 ENCOUNTER — CARDPULM VISIT (OUTPATIENT)
Dept: CARDIAC REHAB | Facility: HOSPITAL | Age: 59
End: 2024-07-10
Payer: COMMERCIAL

## 2024-07-10 PROCEDURE — 93798 PHYS/QHP OP CAR RHAB W/ECG: CPT

## 2024-07-11 ENCOUNTER — CARDPULM VISIT (OUTPATIENT)
Dept: CARDIAC REHAB | Facility: HOSPITAL | Age: 59
End: 2024-07-11
Payer: COMMERCIAL

## 2024-07-11 PROCEDURE — 93798 PHYS/QHP OP CAR RHAB W/ECG: CPT

## 2024-07-15 ENCOUNTER — CARDPULM VISIT (OUTPATIENT)
Dept: CARDIAC REHAB | Facility: HOSPITAL | Age: 59
End: 2024-07-15
Payer: COMMERCIAL

## 2024-07-15 PROCEDURE — 93798 PHYS/QHP OP CAR RHAB W/ECG: CPT

## 2024-07-17 ENCOUNTER — CARDPULM VISIT (OUTPATIENT)
Dept: CARDIAC REHAB | Facility: HOSPITAL | Age: 59
End: 2024-07-17
Payer: COMMERCIAL

## 2024-07-17 PROCEDURE — 93798 PHYS/QHP OP CAR RHAB W/ECG: CPT

## 2024-07-18 ENCOUNTER — CARDPULM VISIT (OUTPATIENT)
Dept: CARDIAC REHAB | Facility: HOSPITAL | Age: 59
End: 2024-07-18
Payer: COMMERCIAL

## 2024-07-18 PROCEDURE — 93798 PHYS/QHP OP CAR RHAB W/ECG: CPT

## 2024-07-22 ENCOUNTER — CARDPULM VISIT (OUTPATIENT)
Dept: CARDIAC REHAB | Facility: HOSPITAL | Age: 59
End: 2024-07-22
Payer: COMMERCIAL

## 2024-07-22 PROCEDURE — 93798 PHYS/QHP OP CAR RHAB W/ECG: CPT

## 2024-07-24 ENCOUNTER — CARDPULM VISIT (OUTPATIENT)
Dept: CARDIAC REHAB | Facility: HOSPITAL | Age: 59
End: 2024-07-24
Payer: COMMERCIAL

## 2024-07-24 ENCOUNTER — APPOINTMENT (OUTPATIENT)
Dept: CARDIAC REHAB | Facility: HOSPITAL | Age: 59
End: 2024-07-24
Payer: COMMERCIAL

## 2024-07-24 PROCEDURE — 93798 PHYS/QHP OP CAR RHAB W/ECG: CPT

## 2024-07-25 ENCOUNTER — CARDPULM VISIT (OUTPATIENT)
Dept: CARDIAC REHAB | Facility: HOSPITAL | Age: 59
End: 2024-07-25
Payer: COMMERCIAL

## 2024-07-25 ENCOUNTER — APPOINTMENT (OUTPATIENT)
Dept: CARDIAC REHAB | Facility: HOSPITAL | Age: 59
End: 2024-07-25
Payer: COMMERCIAL

## 2024-07-25 PROCEDURE — 93798 PHYS/QHP OP CAR RHAB W/ECG: CPT

## 2024-07-29 ENCOUNTER — CARDPULM VISIT (OUTPATIENT)
Dept: CARDIAC REHAB | Facility: HOSPITAL | Age: 59
End: 2024-07-29
Payer: COMMERCIAL

## 2024-07-29 ENCOUNTER — APPOINTMENT (OUTPATIENT)
Dept: CARDIAC REHAB | Facility: HOSPITAL | Age: 59
End: 2024-07-29
Payer: COMMERCIAL

## 2024-07-29 PROCEDURE — 93798 PHYS/QHP OP CAR RHAB W/ECG: CPT

## 2024-07-31 ENCOUNTER — CARDPULM VISIT (OUTPATIENT)
Dept: CARDIAC REHAB | Facility: HOSPITAL | Age: 59
End: 2024-07-31
Payer: COMMERCIAL

## 2024-07-31 ENCOUNTER — APPOINTMENT (OUTPATIENT)
Dept: CARDIAC REHAB | Facility: HOSPITAL | Age: 59
End: 2024-07-31
Payer: COMMERCIAL

## 2024-07-31 PROCEDURE — 93798 PHYS/QHP OP CAR RHAB W/ECG: CPT

## 2024-08-01 ENCOUNTER — CARDPULM VISIT (OUTPATIENT)
Dept: CARDIAC REHAB | Facility: HOSPITAL | Age: 59
End: 2024-08-01
Payer: COMMERCIAL

## 2024-08-01 PROCEDURE — 93798 PHYS/QHP OP CAR RHAB W/ECG: CPT

## 2024-08-05 ENCOUNTER — APPOINTMENT (OUTPATIENT)
Dept: CARDIAC REHAB | Facility: HOSPITAL | Age: 59
End: 2024-08-05
Payer: COMMERCIAL

## 2024-08-07 ENCOUNTER — CARDPULM VISIT (OUTPATIENT)
Dept: CARDIAC REHAB | Facility: HOSPITAL | Age: 59
End: 2024-08-07
Payer: COMMERCIAL

## 2024-08-07 PROCEDURE — 93798 PHYS/QHP OP CAR RHAB W/ECG: CPT

## 2024-08-08 ENCOUNTER — CARDPULM VISIT (OUTPATIENT)
Dept: CARDIAC REHAB | Facility: HOSPITAL | Age: 59
End: 2024-08-08
Payer: COMMERCIAL

## 2024-08-08 PROCEDURE — 93798 PHYS/QHP OP CAR RHAB W/ECG: CPT

## 2024-08-12 ENCOUNTER — CARDPULM VISIT (OUTPATIENT)
Dept: CARDIAC REHAB | Facility: HOSPITAL | Age: 59
End: 2024-08-12
Payer: COMMERCIAL

## 2024-08-12 PROCEDURE — 93798 PHYS/QHP OP CAR RHAB W/ECG: CPT

## 2024-08-14 ENCOUNTER — CARDPULM VISIT (OUTPATIENT)
Dept: CARDIAC REHAB | Facility: HOSPITAL | Age: 59
End: 2024-08-14
Payer: COMMERCIAL

## 2024-08-14 PROCEDURE — 93798 PHYS/QHP OP CAR RHAB W/ECG: CPT

## 2024-08-15 ENCOUNTER — APPOINTMENT (OUTPATIENT)
Dept: CARDIAC REHAB | Facility: HOSPITAL | Age: 59
End: 2024-08-15
Payer: COMMERCIAL

## 2024-08-19 ENCOUNTER — APPOINTMENT (OUTPATIENT)
Dept: CARDIAC REHAB | Facility: HOSPITAL | Age: 59
End: 2024-08-19
Payer: COMMERCIAL

## 2024-08-22 ENCOUNTER — APPOINTMENT (OUTPATIENT)
Dept: CARDIAC REHAB | Facility: HOSPITAL | Age: 59
End: 2024-08-22
Payer: COMMERCIAL

## 2024-08-26 ENCOUNTER — APPOINTMENT (OUTPATIENT)
Dept: CARDIAC REHAB | Facility: HOSPITAL | Age: 59
End: 2024-08-26
Payer: COMMERCIAL

## 2024-08-28 ENCOUNTER — CARDPULM VISIT (OUTPATIENT)
Dept: CARDIAC REHAB | Facility: HOSPITAL | Age: 59
End: 2024-08-28
Payer: COMMERCIAL

## 2024-08-28 PROCEDURE — 93798 PHYS/QHP OP CAR RHAB W/ECG: CPT

## 2024-08-29 ENCOUNTER — CARDPULM VISIT (OUTPATIENT)
Dept: CARDIAC REHAB | Facility: HOSPITAL | Age: 59
End: 2024-08-29
Payer: COMMERCIAL

## 2024-08-29 PROCEDURE — 93798 PHYS/QHP OP CAR RHAB W/ECG: CPT

## 2024-09-04 ENCOUNTER — APPOINTMENT (OUTPATIENT)
Dept: CARDIAC REHAB | Facility: HOSPITAL | Age: 59
End: 2024-09-04
Payer: COMMERCIAL

## 2024-09-05 ENCOUNTER — APPOINTMENT (OUTPATIENT)
Dept: CARDIAC REHAB | Facility: HOSPITAL | Age: 59
End: 2024-09-05
Payer: COMMERCIAL

## 2024-09-09 ENCOUNTER — CARDPULM VISIT (OUTPATIENT)
Dept: CARDIAC REHAB | Facility: HOSPITAL | Age: 59
End: 2024-09-09
Payer: COMMERCIAL

## 2024-09-09 PROCEDURE — 93798 PHYS/QHP OP CAR RHAB W/ECG: CPT

## 2024-09-11 ENCOUNTER — CARDPULM VISIT (OUTPATIENT)
Dept: CARDIAC REHAB | Facility: HOSPITAL | Age: 59
End: 2024-09-11
Payer: COMMERCIAL

## 2024-09-11 PROCEDURE — 93798 PHYS/QHP OP CAR RHAB W/ECG: CPT

## 2024-09-12 ENCOUNTER — APPOINTMENT (OUTPATIENT)
Dept: CARDIAC REHAB | Facility: HOSPITAL | Age: 59
End: 2024-09-12
Payer: COMMERCIAL

## 2024-09-16 ENCOUNTER — APPOINTMENT (OUTPATIENT)
Dept: CARDIAC REHAB | Facility: HOSPITAL | Age: 59
End: 2024-09-16
Payer: COMMERCIAL

## 2024-09-18 ENCOUNTER — APPOINTMENT (OUTPATIENT)
Dept: CARDIAC REHAB | Facility: HOSPITAL | Age: 59
End: 2024-09-18
Payer: COMMERCIAL

## 2024-09-19 ENCOUNTER — APPOINTMENT (OUTPATIENT)
Dept: CARDIAC REHAB | Facility: HOSPITAL | Age: 59
End: 2024-09-19
Payer: COMMERCIAL

## 2024-09-23 ENCOUNTER — CARDPULM VISIT (OUTPATIENT)
Dept: CARDIAC REHAB | Facility: HOSPITAL | Age: 59
End: 2024-09-23
Payer: COMMERCIAL

## 2024-09-23 PROCEDURE — 93798 PHYS/QHP OP CAR RHAB W/ECG: CPT

## 2024-09-25 ENCOUNTER — CARDPULM VISIT (OUTPATIENT)
Dept: CARDIAC REHAB | Facility: HOSPITAL | Age: 59
End: 2024-09-25
Payer: COMMERCIAL

## 2024-09-25 PROCEDURE — 93798 PHYS/QHP OP CAR RHAB W/ECG: CPT

## 2024-09-26 ENCOUNTER — CARDPULM VISIT (OUTPATIENT)
Dept: CARDIAC REHAB | Facility: HOSPITAL | Age: 59
End: 2024-09-26
Payer: COMMERCIAL

## 2024-09-26 PROCEDURE — 93798 PHYS/QHP OP CAR RHAB W/ECG: CPT

## 2024-09-30 ENCOUNTER — CARDPULM VISIT (OUTPATIENT)
Dept: CARDIAC REHAB | Facility: HOSPITAL | Age: 59
End: 2024-09-30
Payer: COMMERCIAL

## 2024-09-30 PROCEDURE — 93798 PHYS/QHP OP CAR RHAB W/ECG: CPT

## 2024-10-02 ENCOUNTER — CARDPULM VISIT (OUTPATIENT)
Dept: CARDIAC REHAB | Facility: HOSPITAL | Age: 59
End: 2024-10-02
Payer: COMMERCIAL

## 2024-10-02 PROCEDURE — 93798 PHYS/QHP OP CAR RHAB W/ECG: CPT

## 2024-10-03 ENCOUNTER — CARDPULM VISIT (OUTPATIENT)
Dept: CARDIAC REHAB | Facility: HOSPITAL | Age: 59
End: 2024-10-03
Payer: COMMERCIAL

## 2024-10-03 PROCEDURE — 93798 PHYS/QHP OP CAR RHAB W/ECG: CPT

## 2024-10-07 ENCOUNTER — APPOINTMENT (OUTPATIENT)
Dept: CARDIAC REHAB | Facility: HOSPITAL | Age: 59
End: 2024-10-07
Payer: COMMERCIAL

## (undated) NOTE — ED AVS SNAPSHOT
Sally Craig   MRN: [id number]    Department:  [facility] Emergency Department   Date of Visit:  6/8/2018           Disclosure     Insurance plans vary and the physician(s) referred by the ER may not be covered by your plan.  Please contact your tell this physician (or your personal doctor if your instructions are to return to your personal doctor) about any new or lasting problems. The primary care or specialist physician will see patients referred from the [facility] Emergency Department.  Ronda Dawson

## (undated) NOTE — ED AVS SNAPSHOT
Ozpratima Sepulveda   MRN: [id number]    Department:  [facility] Emergency Department in [place]   Date of Visit:  6/9/2018           Disclosure     Insurance plans vary and the physician(s) referred by the ER may not be covered by your plan.  Please contact tell this physician (or your personal doctor if your instructions are to return to your personal doctor) about any new or lasting problems. The primary care or specialist physician will see patients referred from the [facility] Emergency Department.  Alejandra Painter